# Patient Record
(demographics unavailable — no encounter records)

---

## 2025-02-28 NOTE — DATA REVIEWED
[de-identified] : My review and interpretation of the radiologic studies: X-rays of the right knee 3 views reveal No fractures dislocations or osseous abnormalities.  Growth plate appears to be patent.  There is some mild osteopenia seen on the x-ray.  Otherwise no other abnormalities noted.  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  Cortex appeared to be uncompromised.  No risk for fracture.

## 2025-02-28 NOTE — ASSESSMENT
[FreeTextEntry1] : 13-year-old female child with right knee pain with a history of Lyme infection had undergone extensive treatment with continued difficulties and crepitus.  Diagnosis and prognosis fully explained and all questions were answered.  We have suggested an MRI to further evaluate the knee, but the family would like to hold off at this point.  Our next approach is for the child to undergo extensive physical therapy to help rebalance the muscles around the knee.  She does have significant quadricep atrophy and hamstring weakness.  I believe strengthening and balancing the muscles about the knee will help stabilize things and Help her to continue to recover from this issue. She will also continue her treatment with the functional Lyme specialist.  We will continue to monitor her progress.  We will follow-up with her in 3 months for repeat evaluation.  If there is still continued limited improvement we may consider an MRI at that time.  Diagnosis and prognosis fully explained and all questions were answered by the physician. Understanding was verbalized. Treatment plan was fully discussed and agreed upon by the child and family.  Follow-up in 3 months for repeat clinical exam  This note was partially created using voice recognition software and will inherently be subject to errors including those of syntax and sound alike substitutions which may escape proofreading.  In such instances, the original and intended meaning maybe extrapolated by contextual derivation.  A reasonable effort has been made for proofreading its contents, however errors may still remain. If there are any questions or points of clarification needed please do not hesitate to contact my office.

## 2025-02-28 NOTE — PHYSICAL EXAM
[FreeTextEntry1] : This is a pleasant child who is well developed, well nourished in no apparent distress.  The child is awake and alert. Vital signs as documented. There is no acute skin changes or skin lesions and is warm, pink and dry with no evidence of infection.  No palpable lymph nodes. The head is normocephalic, atraumatic with full range of motion of the cervical spine with no pain.  No nucheal rigidity. There are no masses on the neck. Eyes have normal conjunctiva, normal eyelids and pupils which are round and equal. The child has normal ears, normal nose, normal lips, normal teeth, normal gums with no ulcers or lesions. The child has 2+ pulses for bilateral dorsalis pedis, posterior tibialis, and radial. No peripheral edema.  There is normal respiratory effort. The abdomen is soft and nontender with no masses palpated. There is spontaneous movement of bilateral upper and lower extremities.   The pulses are 2+ at both wrists.  Spine shows no deformity, jorje of hair or dimples.  The pelvis and shoulders appear to be level when the child is standing.   The child is able to get on and off the examining table without difficulty. The child has normal gait and balance.  No pathologic reflexes noted. Sensation is grossly intact in bilateral upper and lower extremities.  Pulses are 2+ at both feet.  There are no palpable masses, warmth, swelling, tenderness, clubbing, cyanosis, or ecchymosis in bilateral upper and lower extremities.The motor exam is 5/5 of bilateral shoulders, elbows, wrists, and hands. Deep tendon reflexes are 2+ at both knees and ankles with downgoing toes.The child has full range of motion of bilateral hips, knees, ankles, and feet with motor exam of 5/5 of both lower extremities.  The lower extremities are negative for Galeazzi. No apparent limb length discrepancy.

## 2025-02-28 NOTE — HISTORY OF PRESENT ILLNESS
[FreeTextEntry1] : 13-year-old female child here for evaluation for right knee pain.  This is Issue started at the beginning of August.  She started having difficulty with her knee.  This occurred roughly around when she had a ballet intensive training.  She started developing increasing swelling in the right knee with fever.  The pain had migrated down the leg with increasing swelling down to the ankle.  This resulted in an evaluation by her pediatrician who then subsequently sent her to the emergency room for a full evaluation.  She had undergone laboratory testing as well as x-rays and an ultrasound which confirmed the diagnosis of Lyme disease.  She had an arthrocentesis which was also evaluated.  She was then subsequently sent home with 1 month of doxycycline.  After which she was followed by a rheumatologist who had her undergo physical therapy which significantly improved her pain.  She subsequently then saw an Infectious disease specialist who felt that physical therapy and rheumatologic management with the the best approach.  Because of continued pain and difficulties with crepitus about her right knee, they subsequently saw functional Lyme specialist who had her undergo supplement therapy as well as another course of antibiotics which Consist of a complex of 3 different medications.  She has been under home instruction.  She has not been able to return to [employer].  She does the exercises she learned from her therapist at home.  They are here for evaluation today suggesting scan of her right knee to see if there is any intra-articular pathology that can explain her symptoms and her seemingly plateauing of her recovery.